# Patient Record
Sex: FEMALE | Race: ASIAN | NOT HISPANIC OR LATINO
[De-identification: names, ages, dates, MRNs, and addresses within clinical notes are randomized per-mention and may not be internally consistent; named-entity substitution may affect disease eponyms.]

---

## 2020-01-28 ENCOUNTER — APPOINTMENT (OUTPATIENT)
Dept: ORTHOPEDIC SURGERY | Facility: CLINIC | Age: 55
End: 2020-01-28

## 2020-02-10 ENCOUNTER — APPOINTMENT (OUTPATIENT)
Dept: ORTHOPEDIC SURGERY | Facility: CLINIC | Age: 55
End: 2020-02-10
Payer: COMMERCIAL

## 2020-02-10 VITALS
HEART RATE: 76 BPM | DIASTOLIC BLOOD PRESSURE: 79 MMHG | SYSTOLIC BLOOD PRESSURE: 115 MMHG | BODY MASS INDEX: 32.2 KG/M2 | HEIGHT: 60 IN | WEIGHT: 164 LBS

## 2020-02-10 DIAGNOSIS — Z80.9 FAMILY HISTORY OF MALIGNANT NEOPLASM, UNSPECIFIED: ICD-10-CM

## 2020-02-10 DIAGNOSIS — S96.819A STRAIN OF OTHER SPECIFIED MUSCLES AND TENDONS AT ANKLE AND FOOT LEVEL, UNSPECIFIED FOOT, INITIAL ENCOUNTER: ICD-10-CM

## 2020-02-10 PROCEDURE — 99204 OFFICE O/P NEW MOD 45 MIN: CPT

## 2020-02-10 PROCEDURE — 73030 X-RAY EXAM OF SHOULDER: CPT | Mod: RT

## 2020-02-10 PROCEDURE — 73590 X-RAY EXAM OF LOWER LEG: CPT | Mod: LT

## 2020-02-10 RX ORDER — GLIMEPIRIDE 4 MG/1
TABLET ORAL
Refills: 0 | Status: ACTIVE | COMMUNITY

## 2020-02-12 ENCOUNTER — APPOINTMENT (OUTPATIENT)
Dept: ORTHOPEDIC SURGERY | Facility: CLINIC | Age: 55
End: 2020-02-12
Payer: COMMERCIAL

## 2020-02-12 VITALS
DIASTOLIC BLOOD PRESSURE: 83 MMHG | HEART RATE: 83 BPM | SYSTOLIC BLOOD PRESSURE: 118 MMHG | WEIGHT: 164 LBS | BODY MASS INDEX: 32.2 KG/M2 | HEIGHT: 60 IN

## 2020-02-12 PROCEDURE — 72050 X-RAY EXAM NECK SPINE 4/5VWS: CPT

## 2020-02-12 PROCEDURE — 99214 OFFICE O/P EST MOD 30 MIN: CPT

## 2020-02-12 NOTE — DISCUSSION/SUMMARY
[Medication Risks Reviewed] : Medication risks reviewed [de-identified] : The patient today has symptoms consistent with cervical radiculitis in the setting of disc degeneration at C5-6.  He has mild impingement syndrome on the right shoulder.  Primarily complaining of neck pain headache stiffness as well as symptoms radiating down her right arm.  She has altered reflexes with diminished biceps and brachialis reflex on the right side compared to the left consistent with her symptoms.  I have recommended MRI for further evaluation.  Prescribed her methocarbamol as well.  I will see her back after the MRI to discuss further treatment options.  She will continue use of meloxicam prescribed 2 days ago by Dr. Ritter.\par \par The patient was educated regarding their condition, treatment options as well as prescribed course of treatment. \par Risks and benefits as well as alternatives to the proposed treatment were also provided to the patient \par They were given the opportunity to have all their questions answered to their satisfaction.\par \par Vital signs were reviewed with the patient and the patient was instructed to followup with their primary care provider for further management.\par \par Healthy lifestyle recommendations were also made including a tobacco free lifestyle, proper diet, and weight control.

## 2020-02-12 NOTE — PHYSICAL EXAM
[UE] : Sensory: Intact in bilateral upper extremities [1+] : right brachioradialis 1+ [2+] : left brachioradialis 2+ [Rad] : radial 2+ and symmetric bilaterally [Perry's Sign] : negative Perry's sign [Plantar Reflex Left Only] : absent on the left [Plantar Reflex Right Only] : absent on the right [DTR Reflexes Clonus Of Right Ankle (___ Beats)] : absent on the right [de-identified] : seen with her grandson\par The pt is awake, alert and oriented to self, place and time, is comfortable and in no acute distress. Gait evaluation reveals a narrow based, non-ataxic, non-antalgic gait. Negative Romberg sign noted. Can heel and toe walk without difficulty. Inspection of the neck, back and upper extremities bilaterally reveals no rashes or ecchymotic lesions. There is no obvious abnormal spinal curvature in the sagittal and coronal planes. No crepitus or instability noted with range of motion of bilateral upper extremities. No joint laxity noted in the upper and lower extremities bilaterally. No atrophy or abnormal movements noted in the upper or lower extremities. No tenderness over the cervical, thoracic, lumbar spine or in the paraspinal, or upper and lower extremity musculature. There is no swelling noted in the upper or lower extremities bilaterally. No cervical lymphadenopathy noted anteriorly.\par . Full range of motion of both shoulders.  Minimal Neer's sign and Hawkin's sign on the right. Negative Spurling's sign bilaterally. In the lumbar spine the patient can forward flex to [default value] and extend[default value] without pain\par Negative Babinski sign and no clonus bilaterally in the upper or lower extremities. [de-identified] : flexion chin to chest with neck pain, et 30 degrees, left and right lat rot ~30 degrees\par trigger right thumb [DTR Reflexes Clonus Of Left Ankle (___ Beats)] : absent on the left [de-identified] : 4 views cervical spine obtained today demonstrate no significant scoliosis.  Straightening of cervical lordosis.  Degenerative changes seen at C5-6 with minimal segmental kyphosis across that region.  Ossification of anterior longitudinal ligament.  No dynamic instability between flexion extension.  Improvement of cervical lordosis and extension.

## 2020-02-12 NOTE — HISTORY OF PRESENT ILLNESS
[Worsening] : worsening [10] : a maximum pain level of 10/10 [Standing] : worsened by standing [Constant] : ~He/She~ states the symptoms seem to be constant [Prolonged Sitting] : worsened by prolonged sitting [Walking] : worsened by walking [8] : a current pain level of 8/10 [___ mths] : [unfilled] month(s) ago [de-identified] : Patient presents here today neck pain with radiating into bilateral shoulder with serve pain on the (RT) shoulder \par There are no recently known injuries reported. \par History of a rotator cuff surgery in 2009. \par Symptom  include  sharp, shooting and radiating pain into her fingers \par meloxicam helps relief pain a little for past 2 days\par Works in [employer] [Physical Therapy] : not relieved by physical therapy [Heat] : not relieved by heat [Rest] : not relieved with rest

## 2020-02-20 ENCOUNTER — APPOINTMENT (OUTPATIENT)
Dept: MRI IMAGING | Facility: IMAGING CENTER | Age: 55
End: 2020-02-20
Payer: COMMERCIAL

## 2020-02-20 ENCOUNTER — OUTPATIENT (OUTPATIENT)
Dept: OUTPATIENT SERVICES | Facility: HOSPITAL | Age: 55
LOS: 1 days | End: 2020-02-20
Payer: COMMERCIAL

## 2020-02-20 DIAGNOSIS — Z90.49 ACQUIRED ABSENCE OF OTHER SPECIFIED PARTS OF DIGESTIVE TRACT: Chronic | ICD-10-CM

## 2020-02-20 DIAGNOSIS — M54.12 RADICULOPATHY, CERVICAL REGION: ICD-10-CM

## 2020-02-20 PROCEDURE — 72141 MRI NECK SPINE W/O DYE: CPT | Mod: 26

## 2020-02-20 PROCEDURE — 72141 MRI NECK SPINE W/O DYE: CPT

## 2020-02-24 ENCOUNTER — APPOINTMENT (OUTPATIENT)
Dept: ORTHOPEDIC SURGERY | Facility: CLINIC | Age: 55
End: 2020-02-24

## 2020-03-09 ENCOUNTER — APPOINTMENT (OUTPATIENT)
Dept: ORTHOPEDIC SURGERY | Facility: CLINIC | Age: 55
End: 2020-03-09
Payer: COMMERCIAL

## 2020-03-09 VITALS — HEART RATE: 86 BPM | DIASTOLIC BLOOD PRESSURE: 78 MMHG | SYSTOLIC BLOOD PRESSURE: 114 MMHG

## 2020-03-09 DIAGNOSIS — M50.30 OTHER CERVICAL DISC DEGENERATION, UNSPECIFIED CERVICAL REGION: ICD-10-CM

## 2020-03-09 DIAGNOSIS — M50.20 OTHER CERVICAL DISC DISPLACEMENT, UNSPECIFIED CERVICAL REGION: ICD-10-CM

## 2020-03-09 DIAGNOSIS — M54.12 RADICULOPATHY, CERVICAL REGION: ICD-10-CM

## 2020-03-09 PROCEDURE — 99214 OFFICE O/P EST MOD 30 MIN: CPT

## 2020-03-09 RX ORDER — DICLOFENAC SODIUM 10 MG/G
1 GEL TOPICAL
Qty: 100 | Refills: 0 | Status: DISCONTINUED | COMMUNITY
Start: 2020-01-07

## 2020-03-09 RX ORDER — CLINDAMYCIN PHOSPHATE 10 MG/ML
1 SOLUTION TOPICAL
Qty: 60 | Refills: 0 | Status: DISCONTINUED | COMMUNITY
Start: 2020-02-24

## 2020-03-09 RX ORDER — FLUCONAZOLE 150 MG/1
150 TABLET ORAL
Qty: 2 | Refills: 0 | Status: DISCONTINUED | COMMUNITY
Start: 2020-02-22

## 2020-03-09 RX ORDER — HYDROCHLOROTHIAZIDE 12.5 MG/1
12.5 CAPSULE ORAL
Qty: 90 | Refills: 0 | Status: DISCONTINUED | COMMUNITY
Start: 2019-12-18

## 2020-03-09 RX ORDER — FLUTICASONE PROPIONATE 50 UG/1
50 SPRAY, METERED NASAL
Qty: 16 | Refills: 0 | Status: DISCONTINUED | COMMUNITY
Start: 2019-12-21

## 2020-03-09 RX ORDER — KETOCONAZOLE 20 MG/G
2 CREAM TOPICAL
Qty: 60 | Refills: 0 | Status: DISCONTINUED | COMMUNITY
Start: 2019-12-20

## 2020-03-09 RX ORDER — PROMETHAZINE HYDROCHLORIDE AND DEXTROMETHORPHAN HYDROBROMIDE ORAL SOLUTION 15; 6.25 MG/5ML; MG/5ML
6.25-15 SOLUTION ORAL
Qty: 240 | Refills: 0 | Status: DISCONTINUED | COMMUNITY
Start: 2020-02-29

## 2020-03-09 RX ORDER — PANTOPRAZOLE 40 MG/1
40 TABLET, DELAYED RELEASE ORAL
Qty: 90 | Refills: 0 | Status: DISCONTINUED | COMMUNITY
Start: 2020-03-04

## 2020-03-09 RX ORDER — DICLOFENAC SODIUM 100 MG/1
100 TABLET, FILM COATED, EXTENDED RELEASE ORAL
Qty: 30 | Refills: 0 | Status: DISCONTINUED | COMMUNITY
Start: 2019-12-31

## 2020-03-09 NOTE — PHYSICAL EXAM
[UE] : Sensory: Intact in bilateral upper extremities [1+] : right brachioradialis 1+ [2+] : left brachioradialis 2+ [Rad] : radial 2+ and symmetric bilaterally [Perry's Sign] : negative Perry's sign [Plantar Reflex Right Only] : absent on the right [Plantar Reflex Left Only] : absent on the left [DTR Reflexes Clonus Of Right Ankle (___ Beats)] : absent on the right [DTR Reflexes Clonus Of Left Ankle (___ Beats)] : absent on the left [de-identified] : The pt is awake, alert and oriented to self, place and time, is comfortable and in no acute distress. Gait evaluation reveals a narrow based, non-ataxic, non-antalgic gait. Negative Romberg sign noted. Can heel and toe walk without difficulty. Inspection of the neck, back and upper extremities bilaterally reveals no rashes or ecchymotic lesions. There is no obvious abnormal spinal curvature in the sagittal and coronal planes. No crepitus or instability noted with range of motion of bilateral upper extremities. No joint laxity noted in the upper and lower extremities bilaterally. No atrophy or abnormal movements noted in the upper or lower extremities. No tenderness over the cervical, thoracic, lumbar spine or in the paraspinal, or upper and lower extremity musculature. There is no swelling noted in the upper or lower extremities bilaterally. No cervical lymphadenopathy noted anteriorly.\par . Full range of motion of both shoulders.  Minimal Neer's sign and Hawkin's sign on the right. Negative Spurling's sign bilaterally. In the lumbar spine the patient can forward flex to [default value] and extend[default value] without pain\par Negative Babinski sign and no clonus bilaterally in the upper or lower extremities. [de-identified] : flexion chin to chest with neck pain, et 30 degrees, left and right lat rot ~30 degrees\par trigger right thumb [de-identified] : EXAM: MR SPINE CERVICAL \par \par PROCEDURE DATE: 02/20/2020 \par \par INTERPRETATION: \par CERVICAL SPINE MRI \par \par CLINICAL INFORMATION: Right-sided neck pain and right shoulder pain. \par TECHNIQUE: Multiplanar, multisequence MRI was obtained of the cervical spine. \par \par FINDINGS: \par \par DISC LEVEL EVALUATION: \par \par C1/2: There is mild degeneration between the dens and the anterior arch of \par C1. \par C2/C3: There is a small disc osteophyte complex on the left without central \par canal or foraminal narrowing. \par C3/C4: There is mild disc bulging and very small osteophyte formation \par without central canal or foraminal narrowing. \par C4/C5: Mild to moderate disc bulging and small osteophyte formation is \par present with mild foraminal narrowing. Disc indents the ventral thecal sac \par without contact upon the cord. \par C5/C6: There is a moderate sized broad-based posterior disc protrusion that \par indents the ventral thecal sac and nearly contacts the cord. This is \par asymmetric to the right causing moderate to severe right foraminal narrowing \par with likely impingement of the exiting right C6 nerve root. There is mild \par left foraminal narrowing. \par C6/C7: Normal \par C7/T1: Normal \par \par ALIGNMENT: There is mild straightening of the cervical lordosis. \par CORD: There is no cord signal abnormality. \par MARROW: There is no bone marrow edema or fracture. \par PERIPHERAL/NECK SOFT TISSUES: Normal \par \par IMPRESSION: Mild multilevel cervical spondylosis with a posterior disc \par protrusion at C5-C6 that is asymmetric to the right and impinges on the \par exiting right C6 nerve root. \par \par LOUIS FISHER M.D., ATTENDING RADIOLOGIST Phone #: [phone] \par This document has been electronically signed. Feb 24 2020 9:28AM

## 2020-03-09 NOTE — DISCUSSION/SUMMARY
[Medication Risks Reviewed] : Medication risks reviewed [de-identified] : At this time the patient reports ongoing right-sided neck pain radiating down the right arm.  Her symptoms are consistent with disc herniation on the right side at C5-6 with neural compression and cervical radiculopathy.  She continues to take ibuprofen 800 mg with relief and a refill was prescribed for her today.  She did not take methocarbamol from her last visit and I recommended she use that on as-needed basis for neck pain and stiffness.  Recommended physical therapy with cervical traction for her as well as a home exercise program as well.  She is not interested in injections at this time but may consider pain management evaluation with cervical epidural steroid injection in the future.  She understands that more definitive treatment would involve anterior cervical decompression with discectomy and fusion versus disc arthroplasty at the C5-6 level.\par \par The patient was educated regarding their condition, treatment options as well as prescribed course of treatment. \par Risks and benefits as well as alternatives to the proposed treatment were also provided to the patient \par They were given the opportunity to have all their questions answered to their satisfaction.\par \par Vital signs were reviewed with the patient and the patient was instructed to followup with their primary care provider for further management.\par \par Healthy lifestyle recommendations were also made including a tobacco free lifestyle, proper diet, and weight control.

## 2020-03-09 NOTE — REASON FOR VISIT
[Follow-Up Visit] : a follow-up visit for [FreeTextEntry2] : Degenerative cervical disc, cervical radiculitis MRI review

## 2020-03-09 NOTE — HISTORY OF PRESENT ILLNESS
[Stable] : stable [7] : a current pain level of 7/10 [Constant] : ~He/She~ states the symptoms seem to be constant [Bending] : worsened by bending [de-identified] : Patient presents here today for neck pain and MRI of cervical spine review DOS 2/20/20, patient reports no changes since last office visit ,current neck pain level is 7/10 in intensity and is constant, neck pain worsens at night and when bending, also patient  verbalizes having numbness and tingling on right arm and hand at times. Patient currently takes Motrin as needed for pain with some relief\par Did not tAke methocarbamol from last visit. Ibuprofen 800 mg is helpful [de-identified] : at night while asleep [de-identified] : Motrin with relief

## 2021-06-29 ENCOUNTER — EMERGENCY (EMERGENCY)
Facility: HOSPITAL | Age: 56
LOS: 1 days | Discharge: HOME | End: 2021-06-29
Attending: EMERGENCY MEDICINE | Admitting: EMERGENCY MEDICINE
Payer: COMMERCIAL

## 2021-06-29 VITALS
DIASTOLIC BLOOD PRESSURE: 66 MMHG | SYSTOLIC BLOOD PRESSURE: 90 MMHG | TEMPERATURE: 97 F | RESPIRATION RATE: 16 BRPM | HEART RATE: 62 BPM | OXYGEN SATURATION: 99 %

## 2021-06-29 VITALS
SYSTOLIC BLOOD PRESSURE: 107 MMHG | HEIGHT: 60 IN | DIASTOLIC BLOOD PRESSURE: 65 MMHG | OXYGEN SATURATION: 100 % | HEART RATE: 81 BPM | TEMPERATURE: 98 F | RESPIRATION RATE: 16 BRPM

## 2021-06-29 DIAGNOSIS — Z90.49 ACQUIRED ABSENCE OF OTHER SPECIFIED PARTS OF DIGESTIVE TRACT: Chronic | ICD-10-CM

## 2021-06-29 PROCEDURE — 99284 EMERGENCY DEPT VISIT MOD MDM: CPT

## 2021-06-29 RX ORDER — OXYCODONE AND ACETAMINOPHEN 5; 325 MG/1; MG/1
1 TABLET ORAL ONCE
Refills: 0 | Status: DISCONTINUED | OUTPATIENT
Start: 2021-06-29 | End: 2021-06-29

## 2021-06-29 RX ORDER — DIAZEPAM 5 MG
5 TABLET ORAL ONCE
Refills: 0 | Status: DISCONTINUED | OUTPATIENT
Start: 2021-06-29 | End: 2021-06-29

## 2021-06-29 RX ORDER — DIAZEPAM 5 MG
1 TABLET ORAL
Qty: 9 | Refills: 0
Start: 2021-06-29 | End: 2021-07-01

## 2021-06-29 RX ORDER — LIDOCAINE 4 G/100G
1 CREAM TOPICAL ONCE
Refills: 0 | Status: COMPLETED | OUTPATIENT
Start: 2021-06-29 | End: 2021-06-29

## 2021-06-29 RX ORDER — IBUPROFEN 200 MG
600 TABLET ORAL ONCE
Refills: 0 | Status: COMPLETED | OUTPATIENT
Start: 2021-06-29 | End: 2021-06-29

## 2021-06-29 RX ORDER — ONDANSETRON 8 MG/1
4 TABLET, FILM COATED ORAL ONCE
Refills: 0 | Status: COMPLETED | OUTPATIENT
Start: 2021-06-29 | End: 2021-06-29

## 2021-06-29 RX ADMIN — Medication 5 MILLIGRAM(S): at 07:26

## 2021-06-29 RX ADMIN — Medication 600 MILLIGRAM(S): at 07:26

## 2021-06-29 RX ADMIN — OXYCODONE AND ACETAMINOPHEN 1 TABLET(S): 5; 325 TABLET ORAL at 10:03

## 2021-06-29 RX ADMIN — ONDANSETRON 4 MILLIGRAM(S): 8 TABLET, FILM COATED ORAL at 08:22

## 2021-06-29 RX ADMIN — LIDOCAINE 1 PATCH: 4 CREAM TOPICAL at 07:26

## 2021-06-29 RX ADMIN — OXYCODONE AND ACETAMINOPHEN 1 TABLET(S): 5; 325 TABLET ORAL at 07:26

## 2021-06-29 NOTE — ED PROVIDER NOTE - NSFOLLOWUPINSTRUCTIONS_ED_ALL_ED_FT
1. TAKE ALL OF YOUR PRESCRIBED OR OVER THE COUNTER MEDICATIONS AS DIRECTED.    2. DO NOT DRIVE OR DRINK ALCOHOL WHILE TAKING THE MEDICATIONS YOU WERE PRESCRIBED.  3. FOLLOW UP WITH THE SPINE CENTER WITHIN 10 DAYS--CALL THE LISTED NUMBER TO MAKE AN APPOINTMENT.  GO FOR YOUR MRI TOMORROW AS SCHEDULED.  4. IF YOU HAD LABS OR IMAGING DONE, YOU WERE GIVEN COPIES OF ALL LABS AND/OR IMAGING RESULTS FROM YOUR ER VISIT--PLEASE TAKE THEM WITH YOU TO YOUR FOLLOW UP APPOINTMENTS.  5. IF NEEDED, CALL PATIENT ACCESS SERVICES AT [phone] TO FIND A PRIMARY CARE PHYSICIAN.  OR CALL [phone] TO MAKE AN APPOINTMENT WITH THE CLINIC.  6. YOU CAN FIND PHYSICIANS OF ALL SPECIALITIES BY VISITING [facility] AND CLICKING ON "FIND A DOCTOR".  7. RETURN TO THE ER FOR ANY WORSENING SYMPTOMS OR CONCERNS.    THANK YOU FOR COMING TO [facility].  HAVE A NICE DAY.

## 2021-06-29 NOTE — ED PROVIDER NOTE - PROGRESS NOTE DETAILS
Dr. Martinez: went to re-evaluate pt; pt sleeping Dr. Martinez: pt feeling "tired" but still with pain; pt asked me to speak with her chiropractor and so I left my phone number for return call; will re-dose Percocet and re-eval Dr. Martinez: I spoke with pt's chiropractor; I agree that pt would benefit from an MRI, which she is planning to have tomorrow; will discharge with PO meds and plan for pt to follow up with spine center and her chiropractor Dr. Martinez: I spoke with pt's chiropractor Dr. Valle; I agree that pt would benefit from an MRI, which she is planning to have tomorrow; will discharge with PO meds and plan for pt to follow up with spine center and her chiropractor Dr. Martinez: pt still with residual pain but able to stand and take some steps; she states that pain is worst when changing position but that she feels some improvement when walking; pt made appointment for MRI tomorrow and is also seen her chiropractor tomorrow; Rx for pain/spasm meds sent to pharmacy and pt to be discharged with spine center referral in addition to her scheduled outpatient plan

## 2021-06-29 NOTE — ED ADULT NURSE NOTE - OBJECTIVE STATEMENT
55 year old coming in for back pain that started 2 days ago, that has gotten worst this morning. Pain in lower back radiating to b/l buttocks and left leg. Pt denies  any prior back pain or trauma. PT denies fever, chills, bladder or bowel incontinence. Pt denies numbness or tingling in extremities. Pt able to walk but painful. Pt able to change position but painful. Pt medicated will monitor pt. 55 year old coming in for back pain that started 2 days ago, that has gotten worst this morning. Pain in lower back radiating to b/l buttocks and left leg. Pt denies  any prior back pain or trauma. PT denies fever, chills, bladder or bowel incontinence. Pt denies numbness or tingling in extremities. Pt able to walk but painful. Pt able to change position but painful. Pt took Tylenol and Motrin no relief. Pt medicated will monitor pt.

## 2021-06-29 NOTE — ED PROVIDER NOTE - CLINICAL SUMMARY MEDICAL DECISION MAKING FREE TEXT BOX
56 yo female with low back pain, likely MSK in origin, no red flags for cord compression, fracture or pyelonephritis.  Scheduling outpatient MRI for tomorrow.  Will give symptom control in ED and re-eval. 54 yo female with low back pain, likely MSK in origin, no red flags for cord compression, fracture or pyelonephritis.  Low utility for imaging in ED in this case.  Scheduling outpatient MRI for tomorrow.  Will give symptom control in ED and re-eval.

## 2021-06-29 NOTE — ED ADULT NURSE NOTE - NSIMPLEMENTINTERV_GEN_ALL_ED
Implemented All Universal Safety Interventions:  [place] to call system. Call bell, personal items and telephone within reach. Instruct patient to call for assistance. Room bathroom lighting operational. Non-slip footwear when patient is off stretcher. Physically safe environment: no spills, clutter or unnecessary equipment. Stretcher in lowest position, wheels locked, appropriate side rails in place.

## 2021-06-29 NOTE — ED PROVIDER NOTE - OBJECTIVE STATEMENT
56 yo female with DM (on PO meds, no insulin), in ED with low back pain.  Pt states pain has been slowly coming on "for a while" but increased over the last 2 days, significantly worse since yesterday.  Unknown trigger--does not remember any bending/lifting/twisting/falls that brought on pain.  Pain in low back and radiating into left LE to below the knee.  No weakness.  No fever, CP/SOB, N/V/D, abdominal pain or urinary/fecal incontinence/retention.  She took acetaminophen and ibuprofen 400 mg for pain with no relief.  Last took any meds yesterday. 56 yo female with DM (on PO meds, no insulin), in ED with low back pain.  Pt states pain has been slowly coming on "for a while" but increased over the last 2 days, significantly worse since yesterday.  Unknown trigger--does not remember any bending/lifting/twisting/falls that brought on pain.  Pain in low back and radiating into left LE to below the knee.  No weakness or numbness.  No fever, CP/SOB, N/V/D, abdominal pain or urinary/fecal incontinence/retention.  She took acetaminophen and ibuprofen 400 mg for pain with no relief.  Last took any meds yesterday.  Saw PMD for this yesterday and is scheduling MRI for tomorrow.

## 2021-06-29 NOTE — ED ADULT TRIAGE NOTE - CHIEF COMPLAINT QUOTE
c/o lower back pain starting last night, worse this morning. Pt appears uncomfortable, denies fall/trauma.

## 2021-06-29 NOTE — ED PROVIDER NOTE - PMH
Acute Pancreatitis    Diabetes    Environmental Allergies    Fibroid    History of Cholecystectomy

## 2021-06-29 NOTE — ED PROVIDER NOTE - PATIENT PORTAL LINK FT
You can access the FollowMyHealth Patient Portal offered by [facility] by registering at the following website: [email or web address]. By joining [employer]’s FollowMyHealth portal, you will also be able to view your health information using other applications (apps) compatible with our system.

## 2021-06-29 NOTE — ED PROVIDER NOTE - PHYSICAL EXAMINATION
Back normal appearing.  Midline cervical/thoracic/lumbosacral spine without bony TTP or step off.  +left lumbosacral paraspinal muscle spasm  Pt able to stand and take 1 step with reproduction of pain in left low back.  Sensation intact and equal bilateral upper and lower extremities.  Strength 5/5 proximal and distal bilateral upper and lower extremities.

## 2021-12-06 ENCOUNTER — EMERGENCY (EMERGENCY)
Facility: HOSPITAL | Age: 56
LOS: 1 days | Discharge: HOME | End: 2021-12-06
Attending: EMERGENCY MEDICINE | Admitting: EMERGENCY MEDICINE
Payer: COMMERCIAL

## 2021-12-06 VITALS
OXYGEN SATURATION: 100 % | TEMPERATURE: 97 F | HEART RATE: 84 BPM | DIASTOLIC BLOOD PRESSURE: 87 MMHG | SYSTOLIC BLOOD PRESSURE: 111 MMHG | RESPIRATION RATE: 22 BRPM

## 2021-12-06 VITALS
SYSTOLIC BLOOD PRESSURE: 121 MMHG | DIASTOLIC BLOOD PRESSURE: 78 MMHG | OXYGEN SATURATION: 98 % | HEIGHT: 60 IN | HEART RATE: 96 BPM | RESPIRATION RATE: 26 BRPM | TEMPERATURE: 97 F

## 2021-12-06 DIAGNOSIS — Z90.49 ACQUIRED ABSENCE OF OTHER SPECIFIED PARTS OF DIGESTIVE TRACT: Chronic | ICD-10-CM

## 2021-12-06 LAB
ALBUMIN SERPL ELPH-MCNC: 4.1 G/DL — SIGNIFICANT CHANGE UP (ref 3.3–5)
ALP SERPL-CCNC: 84 U/L — SIGNIFICANT CHANGE UP (ref 40–120)
ALT FLD-CCNC: <5 U/L — LOW (ref 4–33)
ANION GAP SERPL CALC-SCNC: 11 MMOL/L — SIGNIFICANT CHANGE UP (ref 7–14)
AST SERPL-CCNC: 20 U/L — SIGNIFICANT CHANGE UP (ref 4–32)
B PERT DNA SPEC QL NAA+PROBE: SIGNIFICANT CHANGE UP
B PERT+PARAPERT DNA PNL SPEC NAA+PROBE: SIGNIFICANT CHANGE UP
BASE EXCESS BLDV CALC-SCNC: 6.5 MMOL/L — HIGH (ref -2–3)
BASOPHILS # BLD AUTO: 0.09 K/UL — SIGNIFICANT CHANGE UP (ref 0–0.2)
BASOPHILS NFR BLD AUTO: 0.7 % — SIGNIFICANT CHANGE UP (ref 0–2)
BILIRUB SERPL-MCNC: <0.2 MG/DL — SIGNIFICANT CHANGE UP (ref 0.2–1.2)
BLOOD GAS VENOUS COMPREHENSIVE RESULT: SIGNIFICANT CHANGE UP
BORDETELLA PARAPERTUSSIS (RAPRVP): SIGNIFICANT CHANGE UP
BUN SERPL-MCNC: 18 MG/DL — SIGNIFICANT CHANGE UP (ref 7–23)
C PNEUM DNA SPEC QL NAA+PROBE: SIGNIFICANT CHANGE UP
CALCIUM SERPL-MCNC: 9.5 MG/DL — SIGNIFICANT CHANGE UP (ref 8.4–10.5)
CHLORIDE BLDV-SCNC: 103 MMOL/L — SIGNIFICANT CHANGE UP (ref 96–108)
CHLORIDE SERPL-SCNC: 100 MMOL/L — SIGNIFICANT CHANGE UP (ref 98–107)
CO2 BLDV-SCNC: 34.4 MMOL/L — HIGH (ref 22–26)
CO2 SERPL-SCNC: 28 MMOL/L — SIGNIFICANT CHANGE UP (ref 22–31)
CREAT SERPL-MCNC: 0.77 MG/DL — SIGNIFICANT CHANGE UP (ref 0.5–1.3)
CRP SERPL-MCNC: 8.6 MG/L — HIGH
EOSINOPHIL # BLD AUTO: 1.02 K/UL — HIGH (ref 0–0.5)
EOSINOPHIL NFR BLD AUTO: 8.4 % — HIGH (ref 0–6)
FERRITIN SERPL-MCNC: 42 NG/ML — SIGNIFICANT CHANGE UP (ref 15–150)
FLUAV SUBTYP SPEC NAA+PROBE: SIGNIFICANT CHANGE UP
FLUBV RNA SPEC QL NAA+PROBE: SIGNIFICANT CHANGE UP
GAS PNL BLDV: 138 MMOL/L — SIGNIFICANT CHANGE UP (ref 136–145)
GAS PNL BLDV: SIGNIFICANT CHANGE UP
GLUCOSE BLDV-MCNC: 152 MG/DL — HIGH (ref 70–99)
GLUCOSE SERPL-MCNC: 145 MG/DL — HIGH (ref 70–99)
HADV DNA SPEC QL NAA+PROBE: SIGNIFICANT CHANGE UP
HCO3 BLDV-SCNC: 33 MMOL/L — HIGH (ref 22–29)
HCOV 229E RNA SPEC QL NAA+PROBE: SIGNIFICANT CHANGE UP
HCOV HKU1 RNA SPEC QL NAA+PROBE: SIGNIFICANT CHANGE UP
HCOV NL63 RNA SPEC QL NAA+PROBE: SIGNIFICANT CHANGE UP
HCOV OC43 RNA SPEC QL NAA+PROBE: SIGNIFICANT CHANGE UP
HCT VFR BLD CALC: 41 % — SIGNIFICANT CHANGE UP (ref 34.5–45)
HCT VFR BLDA CALC: 40 % — SIGNIFICANT CHANGE UP (ref 34.5–46.5)
HGB BLD CALC-MCNC: 13.4 G/DL — SIGNIFICANT CHANGE UP (ref 11.5–15.5)
HGB BLD-MCNC: 13.5 G/DL — SIGNIFICANT CHANGE UP (ref 11.5–15.5)
HMPV RNA SPEC QL NAA+PROBE: SIGNIFICANT CHANGE UP
HPIV1 RNA SPEC QL NAA+PROBE: SIGNIFICANT CHANGE UP
HPIV2 RNA SPEC QL NAA+PROBE: SIGNIFICANT CHANGE UP
HPIV3 RNA SPEC QL NAA+PROBE: SIGNIFICANT CHANGE UP
HPIV4 RNA SPEC QL NAA+PROBE: SIGNIFICANT CHANGE UP
IANC: 6.67 K/UL — SIGNIFICANT CHANGE UP (ref 1.5–8.5)
IMM GRANULOCYTES NFR BLD AUTO: 0.2 % — SIGNIFICANT CHANGE UP (ref 0–1.5)
LACTATE BLDV-MCNC: 1.4 MMOL/L — SIGNIFICANT CHANGE UP (ref 0.5–2)
LYMPHOCYTES # BLD AUTO: 29.6 % — SIGNIFICANT CHANGE UP (ref 13–44)
LYMPHOCYTES # BLD AUTO: 3.61 K/UL — HIGH (ref 1–3.3)
M PNEUMO DNA SPEC QL NAA+PROBE: SIGNIFICANT CHANGE UP
MCHC RBC-ENTMCNC: 27.6 PG — SIGNIFICANT CHANGE UP (ref 27–34)
MCHC RBC-ENTMCNC: 32.9 GM/DL — SIGNIFICANT CHANGE UP (ref 32–36)
MCV RBC AUTO: 83.7 FL — SIGNIFICANT CHANGE UP (ref 80–100)
MONOCYTES # BLD AUTO: 0.76 K/UL — SIGNIFICANT CHANGE UP (ref 0–0.9)
MONOCYTES NFR BLD AUTO: 6.2 % — SIGNIFICANT CHANGE UP (ref 2–14)
NEUTROPHILS # BLD AUTO: 6.67 K/UL — SIGNIFICANT CHANGE UP (ref 1.8–7.4)
NEUTROPHILS NFR BLD AUTO: 54.9 % — SIGNIFICANT CHANGE UP (ref 43–77)
NRBC # BLD: 0 /100 WBCS — SIGNIFICANT CHANGE UP
NRBC # FLD: 0 K/UL — SIGNIFICANT CHANGE UP
PCO2 BLDV: 53 MMHG — HIGH (ref 39–42)
PH BLDV: 7.4 — SIGNIFICANT CHANGE UP (ref 7.32–7.43)
PLATELET # BLD AUTO: 310 K/UL — SIGNIFICANT CHANGE UP (ref 150–400)
PO2 BLDV: 26 MMHG — SIGNIFICANT CHANGE UP
POTASSIUM BLDV-SCNC: 4.1 MMOL/L — SIGNIFICANT CHANGE UP (ref 3.5–5.1)
POTASSIUM SERPL-MCNC: 4.2 MMOL/L — SIGNIFICANT CHANGE UP (ref 3.5–5.3)
POTASSIUM SERPL-SCNC: 4.2 MMOL/L — SIGNIFICANT CHANGE UP (ref 3.5–5.3)
PROCALCITONIN SERPL-MCNC: 0.02 NG/ML — SIGNIFICANT CHANGE UP (ref 0.02–0.1)
PROT SERPL-MCNC: 7.4 G/DL — SIGNIFICANT CHANGE UP (ref 6–8.3)
RAPID RVP RESULT: DETECTED
RBC # BLD: 4.9 M/UL — SIGNIFICANT CHANGE UP (ref 3.8–5.2)
RBC # FLD: 14 % — SIGNIFICANT CHANGE UP (ref 10.3–14.5)
RSV RNA SPEC QL NAA+PROBE: SIGNIFICANT CHANGE UP
RV+EV RNA SPEC QL NAA+PROBE: SIGNIFICANT CHANGE UP
SAO2 % BLDV: 44.3 % — SIGNIFICANT CHANGE UP
SARS-COV-2 RNA SPEC QL NAA+PROBE: DETECTED
SODIUM SERPL-SCNC: 139 MMOL/L — SIGNIFICANT CHANGE UP (ref 135–145)
WBC # BLD: 12.18 K/UL — HIGH (ref 3.8–10.5)
WBC # FLD AUTO: 12.18 K/UL — HIGH (ref 3.8–10.5)

## 2021-12-06 PROCEDURE — 99285 EMERGENCY DEPT VISIT HI MDM: CPT

## 2021-12-06 PROCEDURE — 71045 X-RAY EXAM CHEST 1 VIEW: CPT | Mod: 26

## 2021-12-06 RX ORDER — ALBUTEROL 90 UG/1
4 AEROSOL, METERED ORAL ONCE
Refills: 0 | Status: COMPLETED | OUTPATIENT
Start: 2021-12-06 | End: 2021-12-06

## 2021-12-06 RX ORDER — ACETAMINOPHEN 500 MG
650 TABLET ORAL ONCE
Refills: 0 | Status: COMPLETED | OUTPATIENT
Start: 2021-12-06 | End: 2021-12-06

## 2021-12-06 RX ADMIN — Medication 650 MILLIGRAM(S): at 01:37

## 2021-12-06 RX ADMIN — Medication 100 MILLIGRAM(S): at 01:37

## 2021-12-06 RX ADMIN — Medication 650 MILLIGRAM(S): at 02:00

## 2021-12-06 RX ADMIN — ALBUTEROL 4 PUFF(S): 90 AEROSOL, METERED ORAL at 01:54

## 2021-12-06 NOTE — ED PROVIDER NOTE - OBJECTIVE STATEMENT
57 yo F with a pmhx of DM presents to the ED with chest pain and SOB. She recetly travelled to [place]. 55 yo F with a pmhx of DM presents to the ED with chest pain and SOB. She recently travelled to [place]. is vaccinated against covid. Her chest pain is localized over the anterior chest, reproducible with palpation over the anterior sternum, nonexertional in nature. She admits to associated SOB worse with exertion. no LE swelling. admits to associated coughing and wheezing. She denies any fevers, chills, N/V/D. She denies any other symptoms at bedside.

## 2021-12-06 NOTE — ED PROVIDER NOTE - ATTENDING CONTRIBUTION TO CARE
57 yo with hx DM presenting with CP and SOB.  Pain is sharp and over the anterior chest and does not radiate.  Non exertional.  There is some associated SOB and some FLORES.  Has a frequent non productive cough.  No fevers.  Recent travel to [place].  Has been fully vaccinated.      Gen: Well appearing in NAD  Head: NC/AT  Neck: trachea midline  Resp:  mild distress clear lungs  Ext: no deformities  Neuro:  A&O appears non focal  Skin:  Warm and dry as visualized  Psych:  Normal affect and mood     57 yo with s/s viral URI.  COVID is on the differential.  There is no dest with ambulation.  No O2 requirement.  Will get Xray and labs.  Although low suspicion for ACS will get a trop based on risk factor of being [place].  Not consistent with a PE.  MSK most likely with the frequent coughing.  Will reassess after labs and imaging.

## 2021-12-06 NOTE — ED PROVIDER NOTE - ADDITIONAL NOTES AND INSTRUCTIONS:
Please excuse Smita Corwin from work/school as he/she was being evaluated in the Emergency Room at [facility].

## 2021-12-06 NOTE — ED ADULT NURSE NOTE - CHIEF COMPLAINT QUOTE
SOB, cough, chest pain since yesterday - HX diabetes type 2 - tachypneic and wheezing in triage - EKG in progress

## 2021-12-06 NOTE — ED PROVIDER NOTE - NSICDXFAMILYHX_GEN_ALL_CORE_FT
FAMILY HISTORY:  Sibling  Still living? Yes, Estimated age: 41-50  Family history of myocardial infarction, Age at diagnosis: Age Unknown

## 2021-12-06 NOTE — ED PROVIDER NOTE - NSICDXPASTMEDICALHX_GEN_ALL_CORE_FT
PAST MEDICAL HISTORY:  Acute Pancreatitis     Diabetes     Environmental Allergies     Fibroid     History of Cholecystectomy

## 2021-12-06 NOTE — ED PROVIDER NOTE - NSFOLLOWUPINSTRUCTIONS_ED_ALL_ED_FT
-You were seen in the Emergency Department (ED) for cough. Lab and imaging results, if performed, were discussed with you along with your discharge diagnosis. You were found to have a covid infection. Please stay isolated at home.    FOLLOW-UP:  -Please follow up with your private physician within the next 72 hours. Tell them you were recently in the ED for an urgent issue and would like to be seen. Bring copies of your results if you were given.   -If you do not have a PMD, please call [phone] to find one convenient for you or call our clinic at [phone].    MEDICATIONS:  -Continue all other prescribed medicine, IF ANY, as per your primary care doctor's (PMD) recommendations.    PAIN CONTROL:  -Please take over the counter Tylenol (also known as acetaminophen) 650mg every 6 hours or Ibuprofen (also known as motrin, advil) 600mg every 8 hour for your pain, IF ANY, unless you are not supposed to for any reason.  -Rest, stay hydrated with plenty of fluids (drink at least 2 Liters or 64 Ounces of water each day UNLESS you are supposed to restrict fluids or ANY reason.    RETURN PRECAUTIONS:  -Please return to the Emergency Department if you experience ANY new or concerning symptoms, such as, but not limited to: worsening pain, large amount of bleeding, passing out, fever >100.F, shaking chills, inability to see or new double vision, chest pain, difficulty breathing, diffuse abdominal pain, unable to eat or drink, continuous vomiting or diarrhea, unable to move or feel part of your body      -------------    What is a coronavirus?  Coronaviruses are a large family of viruses that cause illnesses ranging from the common cold to more severe diseases such as Middle East Respiratory Syndrome (MERS) and Severe Acute Respiratory Syndrome (SARS).    What is Novel Coronavirus (COVID-19)?  The Centers for Disease Control and Prevention (CDC) is closely monitoring the outbreak caused by COVID-19. For the latest information about COVID-19, visit the CDC website at CDC.gov/Coronavirus    How are coronaviruses spread?  Coronaviruses can be transmitted from person to person, usually after close contact with an infected  person (for example, in a household, workplace, or healthcare setting), via droplets that become airborne after a cough or sneeze. These droplets can then infect a nearby person. Transmission can also occur by touching recently contaminated surfaces.    Is there a treatment for a COVID-19?  There is no specific treatment for disease caused by COVID-19. However, many of the symptoms can be treated based on the patient’s clinical condition. Supportive care for infected persons can be highly effective.    What are the symptoms of coronavirus infection?  It depends on the virus, but common signs include fever and/or respiratory symptoms such as cough and shortness of breath. In more severe cases, infection can cause pneumonia, severe acute respiratory syndrome, kidney failure and even death. Fortunately, most cases of COVID-19 have an illness no different than the influenza (flu), with a majority of these patients having mild symptoms and overall mortality which appears to be not much different than the flu.    What can I do to protect myself?  The best precautionary measures:  – washing your hands  – covering your cough  – disinfecting surfaces  – it is also advisable to avoid close contact with anyone showing symptoms of respiratory illness such as coughing and sneezing  – those with symptoms should wear a surgical mask when around others    What can I do to protect those around me?  If you have been identified as someone who may be infected with COVID-19, we recommend you follow the self-isolation procedures outlined on the following page to protect those around you and to limit the spread of this virus.    We recommend the below precautionary steps from now until 14 days from when you returned from your travel or date of your last known possible contact:    — Do not go to work, school or public areas. Avoid using public transportation, ridesharing or taxis.  — As much as possible, separate yourself from other people in your home. If you can, you should stay in a room and away from other people. Also, you should use a separate bathroom if available.  — Wear the supplied mask whenever you are around other people.  — If you have a non-urgent medical appointment, please reschedule for a later date. If the appointment is urgent, please call the health care provider and tell them that you are on self-isolation for possible COVID-19. This will help the health care provider’s office take steps to keep other people from getting infected or exposed. If you can reschedule routine appointments, do so.  — Wash your hands often with soap and water for at least 15 to 20 seconds or clean your hands with an alcohol-based hand  that contains 60 to 95% alcohol, covering all surfaces of your hands and rubbing them together until they feel dry. Soap and water should be used preferentially if hands are visibly dirty.  — Cover your mouth and nose with a tissue when you cough or sneeze. Throw used tissues in a lined trash can. Immediately wash your hands.  — Avoid touching your eyes, nose, and mouth with your hands.  — Avoid sharing personal household items. You should not share dishes, drinking glasses, cups, eating utensils, towels, or bedding with other people or pets in your home. After using these items, they should be washed thoroughly with soap and water.  — Clean and disinfect all “high-touch” surfaces every day. High touch surfaces include counters, tabletops, doorknobs, light switches, remote controls, bathroom fixtures, toilets, phones, keyboards, tablets, and bedside tables. Also, clean any surfaces that may have blood, stool, or body fluids on them.    ------------------------------------------  Information for patients who have received a COVID-19 test.    The COVID-19 (novel coronavirus) test  Results may take up to 7 days to become available.      If your result is positive, you will receive a phone call from one of our coronavirus specialists. While we will do our best to also call patients with a negative test result, the sheer volume of tests being performed may make this difficult to do in a timely fashion. If you haven’t heard from us within 5 days and you’d like to check on your results, you can check our Labfolder tim or call one of our coronavirus specialists at [address] [place] [address] (available 24/7)    Please DO NOT call the site where you received the test to obtain your results.    If the test is positive -   You will continue home isolation until you are completely well, you have no fever, and it has been at least 14 days since your positive test. The health department in your city or [facility] may also contact you with additional instructions.

## 2021-12-06 NOTE — ED PROVIDER NOTE - PHYSICAL EXAMINATION
GENERAL: no acute distress, non-toxic appearing, no respiratory distress, tussive episodes  HEAD: normocephalic, atraumatic  HEENT: normal conjunctiva, oral mucosa moist, neck supple  CARDIAC: regular rate and rhythm, normal S1 and S2,  no appreciable murmurs  PULM: coarse lung sounds diffusely, audible wheezing  GI: abdomen nondistended, soft, nontender, no guarding or rebound tenderness  : no CVA tenderness, no suprapubic tenderness  NEURO: alert and oriented x 3, normal speech, PERRLA, EOMI, no focal motor or sensory deficits, nonantalgic gait  MSK: no visible deformities, no peripheral edema, calf tenderness/redness/swelling  SKIN: no visible rashes, dry, well-perfused  PSYCH: appropriate mood and affect

## 2021-12-06 NOTE — ED ADULT TRIAGE NOTE - CHIEF COMPLAINT QUOTE
SOB, cough, chest pain since yesterday - HX diabetes - tachypneic and wheezing in triage - EKG in progress SOB, cough, chest pain since yesterday - HX diabetes type 2 - tachypneic and wheezing in triage - EKG in progress

## 2021-12-06 NOTE — ED PROVIDER NOTE - PROGRESS NOTE DETAILS
reassessed, breathing well on RA, saturation 98%. no acute respiratory distress. amb sat in the 99%. RR low 20 well appearing at bedside. positive covid diagnosis. Will have patient dc with isolation precautions.

## 2021-12-06 NOTE — ED PROVIDER NOTE - PATIENT PORTAL LINK FT
You can access the FollowMyHealth Patient Portal offered by [facility] by registering at the following website: [email or web address]. By joining [employer]’s FollowMyHealth portal, you will also be able to view your health information using other applications (apps) compatible with our system.

## 2021-12-06 NOTE — ED ADULT NURSE NOTE - OBJECTIVE STATEMENT
Pt received to room 16. A&Ox4. Ambulatory at baseline. Pmh of DM2. Pt c/o chest pain that has occurred for 1x on and off, but is now constant since yesterday morning. Pt endorses chest pain on the left side is radiating down the left arm "feeling like my arm is going to fall off; it is a 10/10." Pt denies taking anything for the pain. Pt also endorsing dry, unproductive cough began as of yesterday. Pt states she has not been around anyone sick, but has traveled to [place] about a week ago. Pt denies history of COPD or asthma. Pt presenting with SOB w/ audible wheezing upon auscultation of breath sounds anterior and posterior, resp even, labored, pt placed on 3L NC satting 100%, abd soft nontender bowel sounds heard in all four quadrants, pedal pulses 2+ bilaterally, NSR on the monitor. Pt denies nausea, vomiting, diarrhea, headache, numbness/tingling, visual disturbances. Pt vitally stable. Awaiting further orders from MD.

## 2021-12-06 NOTE — ED PROVIDER NOTE - CLINICAL SUMMARY MEDICAL DECISION MAKING FREE TEXT BOX
57 yo F with a pmhx of DM presents to the ED with chest pain and SOB. She recently travelled to [place]. is vaccinated against covid. coughing and wheezing at bedside. vitals wnl. PE as noted above. concerns for infectious etiology vs covid vs pneumonia. no sick contacts. will order labs, imaging, ekg, meds, reassess

## 2021-12-07 ENCOUNTER — EMERGENCY (EMERGENCY)
Facility: HOSPITAL | Age: 56
LOS: 1 days | Discharge: HOME | End: 2021-12-07
Admitting: EMERGENCY MEDICINE
Payer: COMMERCIAL

## 2021-12-07 VITALS
SYSTOLIC BLOOD PRESSURE: 124 MMHG | TEMPERATURE: 98 F | DIASTOLIC BLOOD PRESSURE: 84 MMHG | RESPIRATION RATE: 17 BRPM | HEART RATE: 89 BPM | OXYGEN SATURATION: 100 % | HEIGHT: 60 IN

## 2021-12-07 VITALS
SYSTOLIC BLOOD PRESSURE: 119 MMHG | TEMPERATURE: 98 F | OXYGEN SATURATION: 100 % | RESPIRATION RATE: 18 BRPM | HEART RATE: 78 BPM | DIASTOLIC BLOOD PRESSURE: 78 MMHG

## 2021-12-07 DIAGNOSIS — Z90.49 ACQUIRED ABSENCE OF OTHER SPECIFIED PARTS OF DIGESTIVE TRACT: Chronic | ICD-10-CM

## 2021-12-07 LAB
ALBUMIN SERPL ELPH-MCNC: 4.2 G/DL — SIGNIFICANT CHANGE UP (ref 3.3–5)
ALP SERPL-CCNC: 57 U/L — SIGNIFICANT CHANGE UP (ref 40–120)
ALT FLD-CCNC: 22 U/L — SIGNIFICANT CHANGE UP (ref 4–33)
ANION GAP SERPL CALC-SCNC: 9 MMOL/L — SIGNIFICANT CHANGE UP (ref 7–14)
APTT BLD: 33.1 SEC — SIGNIFICANT CHANGE UP (ref 27–36.3)
AST SERPL-CCNC: 24 U/L — SIGNIFICANT CHANGE UP (ref 4–32)
BASOPHILS # BLD AUTO: 0.08 K/UL — SIGNIFICANT CHANGE UP (ref 0–0.2)
BASOPHILS NFR BLD AUTO: 0.8 % — SIGNIFICANT CHANGE UP (ref 0–2)
BILIRUB SERPL-MCNC: <0.2 MG/DL — SIGNIFICANT CHANGE UP (ref 0.2–1.2)
BUN SERPL-MCNC: 16 MG/DL — SIGNIFICANT CHANGE UP (ref 7–23)
CALCIUM SERPL-MCNC: 9.6 MG/DL — SIGNIFICANT CHANGE UP (ref 8.4–10.5)
CHLORIDE SERPL-SCNC: 102 MMOL/L — SIGNIFICANT CHANGE UP (ref 98–107)
CO2 SERPL-SCNC: 27 MMOL/L — SIGNIFICANT CHANGE UP (ref 22–31)
CREAT SERPL-MCNC: 0.67 MG/DL — SIGNIFICANT CHANGE UP (ref 0.5–1.3)
CRP SERPL-MCNC: 4.9 MG/L — SIGNIFICANT CHANGE UP
EOSINOPHIL # BLD AUTO: 1 K/UL — HIGH (ref 0–0.5)
EOSINOPHIL NFR BLD AUTO: 10.1 % — HIGH (ref 0–6)
FERRITIN SERPL-MCNC: 45 NG/ML — SIGNIFICANT CHANGE UP (ref 15–150)
GLUCOSE SERPL-MCNC: 97 MG/DL — SIGNIFICANT CHANGE UP (ref 70–99)
HCT VFR BLD CALC: 41.9 % — SIGNIFICANT CHANGE UP (ref 34.5–45)
HGB BLD-MCNC: 13.3 G/DL — SIGNIFICANT CHANGE UP (ref 11.5–15.5)
IANC: 4.63 K/UL — SIGNIFICANT CHANGE UP (ref 1.5–8.5)
IMM GRANULOCYTES NFR BLD AUTO: 0.2 % — SIGNIFICANT CHANGE UP (ref 0–1.5)
INR BLD: 1.14 RATIO — SIGNIFICANT CHANGE UP (ref 0.88–1.16)
LYMPHOCYTES # BLD AUTO: 3.53 K/UL — HIGH (ref 1–3.3)
LYMPHOCYTES # BLD AUTO: 35.5 % — SIGNIFICANT CHANGE UP (ref 13–44)
MCHC RBC-ENTMCNC: 27 PG — SIGNIFICANT CHANGE UP (ref 27–34)
MCHC RBC-ENTMCNC: 31.7 GM/DL — LOW (ref 32–36)
MCV RBC AUTO: 85 FL — SIGNIFICANT CHANGE UP (ref 80–100)
MONOCYTES # BLD AUTO: 0.67 K/UL — SIGNIFICANT CHANGE UP (ref 0–0.9)
MONOCYTES NFR BLD AUTO: 6.7 % — SIGNIFICANT CHANGE UP (ref 2–14)
NEUTROPHILS # BLD AUTO: 4.63 K/UL — SIGNIFICANT CHANGE UP (ref 1.8–7.4)
NEUTROPHILS NFR BLD AUTO: 46.7 % — SIGNIFICANT CHANGE UP (ref 43–77)
NRBC # BLD: 0 /100 WBCS — SIGNIFICANT CHANGE UP
NRBC # FLD: 0 K/UL — SIGNIFICANT CHANGE UP
PLATELET # BLD AUTO: 313 K/UL — SIGNIFICANT CHANGE UP (ref 150–400)
POTASSIUM SERPL-MCNC: 3.8 MMOL/L — SIGNIFICANT CHANGE UP (ref 3.5–5.3)
POTASSIUM SERPL-SCNC: 3.8 MMOL/L — SIGNIFICANT CHANGE UP (ref 3.5–5.3)
PROCALCITONIN SERPL-MCNC: 0.04 NG/ML — SIGNIFICANT CHANGE UP (ref 0.02–0.1)
PROT SERPL-MCNC: 7.7 G/DL — SIGNIFICANT CHANGE UP (ref 6–8.3)
PROTHROM AB SERPL-ACNC: 13 SEC — SIGNIFICANT CHANGE UP (ref 10.6–13.6)
RBC # BLD: 4.93 M/UL — SIGNIFICANT CHANGE UP (ref 3.8–5.2)
RBC # FLD: 13.9 % — SIGNIFICANT CHANGE UP (ref 10.3–14.5)
SODIUM SERPL-SCNC: 138 MMOL/L — SIGNIFICANT CHANGE UP (ref 135–145)
WBC # BLD: 9.93 K/UL — SIGNIFICANT CHANGE UP (ref 3.8–10.5)
WBC # FLD AUTO: 9.93 K/UL — SIGNIFICANT CHANGE UP (ref 3.8–10.5)

## 2021-12-07 PROCEDURE — 93010 ELECTROCARDIOGRAM REPORT: CPT | Mod: NC

## 2021-12-07 PROCEDURE — 99285 EMERGENCY DEPT VISIT HI MDM: CPT | Mod: 25

## 2021-12-07 RX ORDER — IPRATROPIUM/ALBUTEROL SULFATE 18-103MCG
3 AEROSOL WITH ADAPTER (GRAM) INHALATION
Qty: 60 | Refills: 0
Start: 2021-12-07 | End: 2021-12-11

## 2021-12-07 RX ORDER — DEXAMETHASONE 0.5 MG/5ML
10 ELIXIR ORAL ONCE
Refills: 0 | Status: DISCONTINUED | OUTPATIENT
Start: 2021-12-07 | End: 2021-12-07

## 2021-12-07 RX ORDER — ALBUTEROL 90 UG/1
2 AEROSOL, METERED ORAL
Qty: 30 | Refills: 0
Start: 2021-12-07 | End: 2021-12-11

## 2021-12-07 RX ORDER — SODIUM CHLORIDE 9 MG/ML
1000 INJECTION INTRAMUSCULAR; INTRAVENOUS; SUBCUTANEOUS ONCE
Refills: 0 | Status: COMPLETED | OUTPATIENT
Start: 2021-12-07 | End: 2021-12-07

## 2021-12-07 RX ORDER — MAGNESIUM SULFATE 500 MG/ML
2 VIAL (ML) INJECTION ONCE
Refills: 0 | Status: COMPLETED | OUTPATIENT
Start: 2021-12-07 | End: 2021-12-07

## 2021-12-07 RX ORDER — IPRATROPIUM/ALBUTEROL SULFATE 18-103MCG
3 AEROSOL WITH ADAPTER (GRAM) INHALATION ONCE
Refills: 0 | Status: COMPLETED | OUTPATIENT
Start: 2021-12-07 | End: 2021-12-07

## 2021-12-07 RX ADMIN — Medication 150 GRAM(S): at 21:47

## 2021-12-07 RX ADMIN — SODIUM CHLORIDE 1000 MILLILITER(S): 9 INJECTION INTRAMUSCULAR; INTRAVENOUS; SUBCUTANEOUS at 21:47

## 2021-12-07 RX ADMIN — Medication 3 MILLILITER(S): at 21:47

## 2021-12-07 NOTE — ED PROVIDER NOTE - PHYSICAL EXAMINATION
Vital signs reviewed.   CONSTITUTIONAL: Well-appearing; well-nourished; in no apparent distress. Non-toxic appearing.   HEAD: Normocephalic, atraumatic.  EYES: Normal conjunctiva and no sclera injection noted  ENT: normal nose; no rhinorrhea;  CARD: Normal S1, S2  RESP: Normal chest excursion with respiration; RT anterior/posterior wheezing. Diminished breath sounds.  ABD/GI: soft, non-distended; non-tender  EXT/MS: moves all extremities; distal pulses are normal, no pedal edema.  SKIN: Normal for age and race; warm; dry; good turgor; no apparent lesions or exudate noted.  NEURO: Awake, alert, oriented x 3  PSYCH: Normal mood; appropriate affect.

## 2021-12-07 NOTE — ED PROVIDER NOTE - OBJECTIVE STATEMENT
55 Y/O F w/ PMH of Diabetes and cholecystectomy presents for Covid exacerbation. Admits to onset of dry cough, subjective fever and body aches yesterday. Seen in ED and found to have Covid. Not vaccinated. Reports today w/ worsening shortness of breath. No alleviating factors. No hx of asthma. No hx of intubations/admissions for asthma exacerbations. Taking Theraflu w/ minimal relief. Denies recent travel or sick contacts. Denies NVD, agnosia, HA, dizziness, chest pain or palpitations

## 2021-12-07 NOTE — ED PROVIDER NOTE - NS ED ROS FT
Constitutional: (-) fever   Head: Normal cephalic, Atraumatic  Eyes/ENT: (-) vision changes, (-) hearing chnages  Cardiovascular: (-) chest pain, (-) wheezing  Respiratory: (+) cough, (+) shortness of breath  Gastrointestinal: (-) vomiting, (-) diarrhea, (-) abdominal pain  : (-) dysuria   Musculoskeletal: (-) back pain  Integumentary: (-) rash, (-) edema  Neurological: (-)loc  Allergic/Immunologic: (-) pruritus

## 2021-12-07 NOTE — ED ADULT NURSE NOTE - OBJECTIVE STATEMENT
received pt to intake 2 aox4 in no apparent distress VSS c/o cough. Pt states was seen in ED yesterday for SOB told she is covid +. Pt states she is returning because unsure if covid diagnosis is correct concerned she has new asthma. Pt endorses slight FLORES. Denies currents SOB ro chest pain. Pt endorses malaise and body aches. Breaths equal and unlabored 18 G PIV R AC placed labs sent medicated as per order, EKG in progress. Will continue to monitor

## 2021-12-07 NOTE — ED PROVIDER NOTE - PATIENT PORTAL LINK FT
You can access the FollowMyHealth Patient Portal offered by [facility] by registering at the following website: [email or web address]. By joining [employer]’s FollowMyHealth portal, you will also be able to view your health information using other applications (apps) compatible with our system.

## 2021-12-07 NOTE — ED ADULT NURSE NOTE - ISOLATION TYPE:
Depth Of Biopsy: dermis Billing Type: [employer] Detail Level: Detailed Anesthesia Type: 1% lidocaine with epinephrine and a 1:10 solution of 8.4% sodium bicarbonate Notification Instructions: Patient will be notified of biopsy results. However, patient instructed to call the office if not contacted within 2 weeks. Dressing: bandage Lab: [address] [place] [address] Anesthesia Volume In Cc (Will Not Render If 0): 0.6 Cryotherapy Text: The wound bed was treated with cryotherapy after the biopsy was performed. Electrodesiccation Text: The wound bed was treated with electrodesiccation after the biopsy was performed. Bill For Surgical Tray: no X Size Of Lesion In Cm: 0 Render Post-Care Instructions In Note?: yes Silver Nitrate Text: The wound bed was treated with silver nitrate after the biopsy was performed. Consent: The provider's intent is to obtain a tissue sample solely for diagnostic purposes. Written consent was obtained and risks were reviewed including but not limited to scarring, infection, bleeding, scabbing, incomplete removal, nerve damage and allergy to anesthesia. Biopsy Method: Dermablade Hemostasis: Drysol Type Of Destruction Used: Curettage Wound Care: Mupirocin Lab Facility: [id number] Curettage Text: The wound bed was treated with curettage after the biopsy was performed. Airborne+Contact precautions Post-Care Instructions: I reviewed with the patient in detail post-care instructions. Patient is to keep the biopsy site dry overnight, and then apply mupirocin twice daily until healed. Biopsy Type: H and E Electrodesiccation And Curettage Text: The wound bed was treated with electrodesiccation and curettage after the biopsy was performed.

## 2021-12-07 NOTE — ED PROVIDER NOTE - CLINICAL SUMMARY MEDICAL DECISION MAKING FREE TEXT BOX
55 Y/O F w/ PMH of Diabetes and cholecystectomy presents for Covid exacerbation.   Reports improvement of symptoms following nebulizer and Magnesium  Referred to [facility]  RX medications sent  Return precautions given 57 Y/O F w/ PMH of Diabetes and cholecystectomy presents for Covid exacerbation.   Resting 97% and ambulatory 97%  Declined steroids due to previous flare up of pancreatitis  CTA following medications  Reports improvement of symptoms following nebulizer and Magnesium  Referred to [facility]  RX medications sent  Return precautions given

## 2021-12-07 NOTE — ED ADULT TRIAGE NOTE - CHIEF COMPLAINT QUOTE
pt diagnosed with covid c/o cough associated with SOB. pt aox4. rr even and unlabored, in NAD. pt on room air. arrived with  as visitor, sent home to quarantine.

## 2021-12-07 NOTE — ED PROVIDER NOTE - NSFOLLOWUPCLINICS_GEN_ALL_ED_FT
[facility] Pulmonolgy and Sleep Medicine  Pulmonology  [address] [place] [address], [place] [address]  Phone: [phone]  Fax:   Follow Up Time: Routine

## 2021-12-08 ENCOUNTER — APPOINTMENT (OUTPATIENT)
Dept: PULMONOLOGY | Facility: CLINIC | Age: 56
End: 2021-12-08
Payer: COMMERCIAL

## 2021-12-08 PROCEDURE — 99203 OFFICE O/P NEW LOW 30 MIN: CPT | Mod: 95

## 2021-12-08 RX ORDER — FLUTICASONE FUROATE AND VILANTEROL TRIFENATATE 200; 25 UG/1; UG/1
200-25 POWDER RESPIRATORY (INHALATION) DAILY
Qty: 1 | Refills: 1 | Status: ACTIVE | COMMUNITY
Start: 2021-12-08 | End: 1900-01-01

## 2021-12-08 NOTE — HISTORY OF PRESENT ILLNESS
[Home] : at home, [unfilled] , at the time of the visit. [Medical Office: ([facility])___] : at the medical office located in  [Spouse] : spouse [Verbal consent obtained from patient] : the patient, [unfilled] [FreeTextEntry1] : 56-year-old woman, initial telehealth visit, referred to the [facility] program for Covid.\par \par Patient was evaluated and released from the emergency department at [facility] on December 6 and again on December 7.\par \par She was vaccinated with Pfizer in March and April.\par She has a past medical history of diabetes\par And, she reports that she gets a cough every winter that lasts for months - possibly asthma.  But no treatment\par Non-smoker\par \par Symptoms started on December 5, myalgias, headache, and cough.\par She was evaluated in the ED on 6 tested positive for Covid.  Treated and released, after albuterol.  She returned yesterday because she was still coughing a lot, again treated with bronchodilators and released chest x-ray was normal labs were okay.  Oxygen saturation percent.\par \par Patient today continues to complain of she finds it difficult to breathe.  Ports intermittent wheezing as well.  Otherwise her breathing is okay.  She does have a pulse oximeter and oxygen saturations remain 100% here.  She can ambulate without shortness of breath and her pulse ox remains normal.  She is taking in plenty of fluids.  Afebrile.\par \par Past medical history includes diabetes for which she takes glimepiride and Metformin.  She also has a history of pancreatitis which she says was triggered by \par \par On video, patient is coughing frequently but in between is speaking clearly and easily with no distress.  Pulse ox percent.\par \par 56-year-old woman, diabetic, possibly with underlying asthma, vaccinated with Pfizer in March and April, now Covid day 4.  Normal oxygen saturations, negative chest x-ray and normal labs in the ED.\par 1.  I offered monoclonal antibody infusion therapy given her diabetes.  At this time the patient does not want to take it, so she does not want anything else in her body.  She will let me know if she reconsiders\par 2.  Patient does seem to have a reactive airways component.  I wanted to give her a short course of prednisone, however she believes that that is working for pancreatitis in the past that she refuses.  She does agree to an ICS/LABA.  Breo as prescribed.  Continue albuterol as well\par Call if anything worsens or if pulse ox <94\par \par Follow-up planned for 12/10\par And, will need eventual follow-up in office with pulmonary function test\par \par

## 2021-12-10 ENCOUNTER — APPOINTMENT (OUTPATIENT)
Dept: PULMONOLOGY | Facility: CLINIC | Age: 56
End: 2021-12-10
Payer: COMMERCIAL

## 2021-12-10 PROCEDURE — 99213 OFFICE O/P EST LOW 20 MIN: CPT | Mod: 95

## 2021-12-10 RX ORDER — ALBUTEROL SULFATE 90 UG/1
108 (90 BASE) INHALANT RESPIRATORY (INHALATION)
Qty: 1 | Refills: 3 | Status: ACTIVE | COMMUNITY
Start: 2021-12-10 | End: 1900-01-01

## 2021-12-10 RX ORDER — FLUTICASONE PROPIONATE 50 UG/1
50 SPRAY, METERED NASAL
Qty: 2 | Refills: 5 | Status: ACTIVE | COMMUNITY
Start: 2021-12-10 | End: 1900-01-01

## 2021-12-10 NOTE — HISTORY OF PRESENT ILLNESS
[Home] : at home, [unfilled] , at the time of the visit. [Medical Office: ([facility])___] : at the medical office located in  [Verbal consent obtained from patient] : the patient, [unfilled] [FreeTextEntry1] : f/u TEB. COVID\par \par feeling better.  afebrile. myalgias improved\par using the breo. and albuterol prn\par walking around and doing her household tasks, no SOB\par 02 sats are 100%\par Still coughing, particularly at night , lyng down\par \par on video -- looking well. speaking clearly . no distress\par \par Covid, clinically improving\par possible h/o asthma\par continue Breo, prn alb\par add flonase for for upper airway component cough\par f/u in person with PFT end of month (pt would like to come in before new high deductible hits in new year)

## 2021-12-29 ENCOUNTER — APPOINTMENT (OUTPATIENT)
Dept: PULMONOLOGY | Facility: CLINIC | Age: 56
End: 2021-12-29

## 2021-12-29 ENCOUNTER — APPOINTMENT (OUTPATIENT)
Dept: PULMONOLOGY | Facility: CLINIC | Age: 56
End: 2021-12-29
Payer: COMMERCIAL

## 2021-12-29 VITALS
WEIGHT: 145 LBS | SYSTOLIC BLOOD PRESSURE: 121 MMHG | RESPIRATION RATE: 15 BRPM | HEART RATE: 86 BPM | DIASTOLIC BLOOD PRESSURE: 81 MMHG | OXYGEN SATURATION: 95 % | BODY MASS INDEX: 28.47 KG/M2 | HEIGHT: 60 IN | TEMPERATURE: 97 F

## 2021-12-29 DIAGNOSIS — Z86.16 PERSONAL HISTORY OF COVID-19: ICD-10-CM

## 2021-12-29 DIAGNOSIS — U07.1 COVID-19: ICD-10-CM

## 2021-12-29 DIAGNOSIS — R09.82 POSTNASAL DRIP: ICD-10-CM

## 2021-12-29 DIAGNOSIS — R05.9 COUGH, UNSPECIFIED: ICD-10-CM

## 2021-12-29 PROCEDURE — 94010 BREATHING CAPACITY TEST: CPT

## 2021-12-29 PROCEDURE — 99213 OFFICE O/P EST LOW 20 MIN: CPT | Mod: 25

## 2021-12-29 PROCEDURE — ZZZZZ: CPT

## 2021-12-29 PROCEDURE — 94729 DIFFUSING CAPACITY: CPT

## 2021-12-29 PROCEDURE — 94726 PLETHYSMOGRAPHY LUNG VOLUMES: CPT

## 2021-12-29 RX ORDER — LEVOFLOXACIN 500 MG/1
500 TABLET, FILM COATED ORAL
Qty: 10 | Refills: 0 | Status: DISCONTINUED | COMMUNITY
Start: 2020-02-22 | End: 2021-12-29

## 2021-12-29 RX ORDER — METHOCARBAMOL 500 MG/1
500 TABLET, FILM COATED ORAL 3 TIMES DAILY
Qty: 30 | Refills: 0 | Status: DISCONTINUED | COMMUNITY
Start: 2020-02-12 | End: 2021-12-29

## 2021-12-29 RX ORDER — MELOXICAM 15 MG/1
15 TABLET ORAL DAILY
Qty: 30 | Refills: 0 | Status: DISCONTINUED | COMMUNITY
Start: 2020-02-10 | End: 2021-12-29

## 2021-12-29 RX ORDER — IBUPROFEN 800 MG/1
800 TABLET, FILM COATED ORAL 3 TIMES DAILY
Qty: 90 | Refills: 2 | Status: DISCONTINUED | COMMUNITY
Start: 2020-02-22 | End: 2021-12-29

## 2021-12-29 NOTE — REVIEW OF SYSTEMS
[Sore Throat] : sore throat [Postnasal Drip] : postnasal drip [Cough] : cough [Arthralgias] : arthralgias [Diabetes] : diabetes [Negative] : Psychiatric [Sputum] : no sputum [Dyspnea] : no dyspnea

## 2021-12-29 NOTE — PHYSICAL EXAM
[No Acute Distress] : no acute distress [Normal Oropharynx] : normal oropharynx [I] : Mallampati Class: I [Normal Appearance] : normal appearance [Supple] : supple [Normal Rate/Rhythm] : normal rate/rhythm [Normal S1, S2] : normal s1, s2 [No Murmurs] : no murmurs [No Acc Muscle Use] : no acc muscle use [Normal Rhythm and Effort] : normal rhythm and effort [Clear to Auscultation Bilaterally] : clear to auscultation bilaterally [No Abnormalities] : no abnormalities [Benign] : benign [Normal Gait] : normal gait [No Clubbing] : no clubbing [No Cyanosis] : no cyanosis [No Edema] : no edema [Normal Color/ Pigmentation] : normal color/ pigmentation [No Focal Deficits] : no focal deficits [Oriented x3] : oriented x3 [Normal Mood] : normal mood [Normal Affect] : normal affect

## 2021-12-29 NOTE — HISTORY OF PRESENT ILLNESS
[TextBox_4] : 56F with DM, h/o pancreatitis in the past, uncomplicated COVID-19 infection 4/2020 and in early 12/2021, vaccinated x2 with pfizer presents for dry cough and sore throat for 2 days. Pt was last seen as a telehealth visit 12/10 for follow up of COVID-19 we started LABA-ICS for possible reactive airway component, ordered PFT but pt was unable to complete due to confusion about COVID-19 testing. Pt did not take LABA-ICS because her symptoms resolved and she is hesitant to take any medication concerning steroids as she was told steroids triggered her previous episodes of pancreatitis. She took albuterol a few times but felt it didn't help. She is back to today because her dry cough and sore throat recurred starting 2 days ago, also ga postnasal drip. No fever, dyspnea on exertion of chest pain.

## 2021-12-29 NOTE — DISCUSSION/SUMMARY
[FreeTextEntry1] : 56F with DM, h/o pancreatitis in the past, uncomplicated COVID-19 infection 4/2020 and in early 12/2021, vaccinated x2 with pfizer presents for dry cough and sore throat for 2 days, likely non-COVID viral URI. Pt was last seen as a telehealth visit 12/10 for follow up of uncomplicated COVID-19, and suspected contribution of reactive airway disease but she is not wheezing now. Recommended that pt undergo PFT to assess for asthma, but she does not wish to take breo or flonase at the present time. \par \par \par \par \par

## 2022-01-05 ENCOUNTER — NON-APPOINTMENT (OUTPATIENT)
Age: 57
End: 2022-01-05

## 2022-02-28 ENCOUNTER — NON-APPOINTMENT (OUTPATIENT)
Age: 57
End: 2022-02-28

## 2022-02-28 ENCOUNTER — APPOINTMENT (OUTPATIENT)
Dept: ORTHOPEDIC SURGERY | Facility: CLINIC | Age: 57
End: 2022-02-28
Payer: COMMERCIAL

## 2022-02-28 VITALS — HEART RATE: 87 BPM | SYSTOLIC BLOOD PRESSURE: 114 MMHG | DIASTOLIC BLOOD PRESSURE: 72 MMHG

## 2022-02-28 DIAGNOSIS — M25.511 PAIN IN RIGHT SHOULDER: ICD-10-CM

## 2022-02-28 DIAGNOSIS — M17.12 UNILATERAL PRIMARY OSTEOARTHRITIS, LEFT KNEE: ICD-10-CM

## 2022-02-28 DIAGNOSIS — M25.562 PAIN IN LEFT KNEE: ICD-10-CM

## 2022-02-28 DIAGNOSIS — M23.92 UNSPECIFIED INTERNAL DERANGEMENT OF LEFT KNEE: ICD-10-CM

## 2022-02-28 PROCEDURE — 73564 X-RAY EXAM KNEE 4 OR MORE: CPT | Mod: LT

## 2022-02-28 PROCEDURE — 99214 OFFICE O/P EST MOD 30 MIN: CPT

## 2022-02-28 PROCEDURE — 73030 X-RAY EXAM OF SHOULDER: CPT | Mod: RT

## 2022-02-28 RX ORDER — MELOXICAM 15 MG/1
15 TABLET ORAL
Qty: 30 | Refills: 0 | Status: ACTIVE | COMMUNITY
Start: 2022-02-28 | End: 1900-01-01

## 2024-10-19 NOTE — ED ADULT TRIAGE NOTE - PAIN: PRESENCE, MLM
Increase Mirtazapine to 15 mg by mouth every HS   Ensure Clear   
PMH  
PMH Hiatal hernia   Carafate 1 gram AC/HS  Pepcid 20 mg  by mouth every HS   Omeprazole 20 mg by mouth every am   Dr Jerome (GI)  9/6/2024 H/H 11.7/34.9  
denies pain/discomfort